# Patient Record
Sex: MALE | Race: ASIAN | NOT HISPANIC OR LATINO | URBAN - METROPOLITAN AREA
[De-identification: names, ages, dates, MRNs, and addresses within clinical notes are randomized per-mention and may not be internally consistent; named-entity substitution may affect disease eponyms.]

---

## 2017-02-02 NOTE — PATIENT DISCUSSION
MILD DRY EYES: PRESCRIBED OTC ARTIFICIAL TEARS BID - QID, OU RETURN FOR FOLLOW-UP AS SCHEDULED OR SOONER IF SYMPTOMS WORSEN.

## 2017-02-02 NOTE — PATIENT DISCUSSION
New Prescription: erythromycin (erythromycin): ointment: 5 mg/gram (0.5 %) a small amount three times a day as directed into affected eye 02-

## 2017-04-24 ENCOUNTER — IMPORTED ENCOUNTER (OUTPATIENT)
Dept: URBAN - METROPOLITAN AREA CLINIC 9 | Facility: CLINIC | Age: 77
End: 2017-04-24

## 2017-08-15 NOTE — PATIENT DISCUSSION
Continue: erythromycin (erythromycin): ointment: 5 mg/gram (0.5 %) a small amount three times a day as directed into affected eye 02-

## 2017-12-20 ENCOUNTER — IMPORTED ENCOUNTER (OUTPATIENT)
Dept: URBAN - METROPOLITAN AREA CLINIC 9 | Facility: CLINIC | Age: 77
End: 2017-12-20

## 2018-06-25 ENCOUNTER — IMPORTED ENCOUNTER (OUTPATIENT)
Dept: URBAN - METROPOLITAN AREA CLINIC 9 | Facility: CLINIC | Age: 78
End: 2018-06-25

## 2018-12-10 ENCOUNTER — IMPORTED ENCOUNTER (OUTPATIENT)
Dept: URBAN - METROPOLITAN AREA CLINIC 9 | Facility: CLINIC | Age: 78
End: 2018-12-10

## 2019-06-11 ENCOUNTER — IMPORTED ENCOUNTER (OUTPATIENT)
Dept: URBAN - METROPOLITAN AREA CLINIC 9 | Facility: CLINIC | Age: 79
End: 2019-06-11

## 2019-12-17 ENCOUNTER — IMPORTED ENCOUNTER (OUTPATIENT)
Dept: URBAN - METROPOLITAN AREA CLINIC 9 | Facility: CLINIC | Age: 79
End: 2019-12-17

## 2020-02-11 NOTE — PATIENT DISCUSSION
Continue: Artificial Tears (polyvin alc) (polyvinyl alcohol): drops: 1.4% drop every three days into both eyes

## 2020-06-16 ENCOUNTER — IMPORTED ENCOUNTER (OUTPATIENT)
Dept: URBAN - METROPOLITAN AREA CLINIC 9 | Facility: CLINIC | Age: 80
End: 2020-06-16

## 2020-12-16 ENCOUNTER — IMPORTED ENCOUNTER (OUTPATIENT)
Dept: URBAN - METROPOLITAN AREA CLINIC 9 | Facility: CLINIC | Age: 80
End: 2020-12-16

## 2020-12-16 PROBLEM — H04.123: Noted: 2020-12-16

## 2020-12-16 PROBLEM — H02.054: Noted: 2020-12-16

## 2020-12-16 PROBLEM — H35.3131: Noted: 2020-12-16

## 2020-12-16 PROBLEM — H04.223: Noted: 2020-12-16

## 2020-12-16 PROBLEM — H11.152: Noted: 2020-12-16

## 2020-12-16 PROBLEM — H01.025: Noted: 2020-12-16

## 2020-12-16 PROBLEM — H01.022: Noted: 2020-12-16

## 2020-12-16 PROBLEM — E11.9: Noted: 2020-12-16

## 2020-12-16 PROBLEM — H26.491: Noted: 2020-12-16

## 2020-12-16 PROBLEM — INACTIVE: Noted: 2020-12-16

## 2021-10-06 NOTE — PATIENT DISCUSSION
Continue: Artificial Tears (polyvin alc) (polyvinyl alcohol): drops: 1.4% drop every three days into both eyes.

## 2021-10-18 ASSESSMENT — VISUAL ACUITY
OS_SC: 20/30 - SN
OS_SC: 20/40 -2 SN
OD_SC: 20/30 -2 SN
OD_PH: 20/40 + SN
OD_SC: 20/30 SN
OD_SC: 20/30 + SN
OD_SC: 20/40 +2 SN
OS_SC: 20/30 -2 SN
OS_SC: 20/40 SN
OD_SC: 20/30 SN
OS_PH: 20/40 SN
OS_SC: 20/40 -2 SN
OD_SC: 20/40 - SN
OS_SC: 20/40 + SN
OD_SC: 20/30 SN
OS_PH: 20/30 -2 SN
OD_SC: 20/30 +2 SN
OS_SC: 20/30 -2 SN
OS_SC: 20/50 + SN
OD_PH: 20/25 - SN

## 2021-10-18 ASSESSMENT — TONOMETRY
OS_IOP_MMHG: 15
OS_IOP_MMHG: 13
OD_IOP_MMHG: 16
OD_IOP_MMHG: 15
OD_IOP_MMHG: 17
OS_IOP_MMHG: 13
OS_IOP_MMHG: 16
OS_IOP_MMHG: 11
OD_IOP_MMHG: 15
OS_IOP_MMHG: 15
OD_IOP_MMHG: 14
OD_IOP_MMHG: 17

## 2021-12-08 ENCOUNTER — ESTABLISHED PATIENT (OUTPATIENT)
Dept: URBAN - METROPOLITAN AREA CLINIC 17 | Facility: CLINIC | Age: 81
End: 2021-12-08

## 2021-12-08 DIAGNOSIS — H26.491: ICD-10-CM

## 2021-12-08 DIAGNOSIS — H35.3131: ICD-10-CM

## 2021-12-08 DIAGNOSIS — H04.123: ICD-10-CM

## 2021-12-08 DIAGNOSIS — E11.9: ICD-10-CM

## 2021-12-08 PROCEDURE — 92134 CPTRZ OPH DX IMG PST SGM RTA: CPT

## 2021-12-08 PROCEDURE — 99214 OFFICE O/P EST MOD 30 MIN: CPT

## 2021-12-08 ASSESSMENT — VISUAL ACUITY
OU_SC: J3+1
OS_SC: 20/40
OU_SC: 20/40
OD_SC: 20/40

## 2021-12-08 ASSESSMENT — TONOMETRY
OD_IOP_MMHG: 10
OS_IOP_MMHG: 10

## 2022-06-19 RX ORDER — GLIMEPIRIDE 4 MG/1
TABLET ORAL
COMMUNITY

## 2022-06-19 RX ORDER — LOSARTAN POTASSIUM 50 MG/1
TABLET ORAL
COMMUNITY

## 2022-06-19 RX ORDER — LEVOTHYROXINE SODIUM 88 UG/1
TABLET ORAL
COMMUNITY

## 2022-06-19 RX ORDER — ACETAMINOPHEN 500 MG
TABLET ORAL
COMMUNITY

## 2022-06-19 RX ORDER — DOXEPIN HYDROCHLORIDE 3 MG/1
TABLET ORAL
COMMUNITY
Start: 2021-12-21

## 2022-06-19 RX ORDER — TAMSULOSIN HYDROCHLORIDE 0.4 MG/1
CAPSULE ORAL
COMMUNITY

## 2022-06-19 RX ORDER — ROSUVASTATIN CALCIUM 20 MG/1
TABLET, COATED ORAL
COMMUNITY

## 2022-06-19 RX ORDER — SOLIFENACIN SUCCINATE 10 MG/1
TABLET, FILM COATED ORAL
COMMUNITY

## 2022-06-19 RX ORDER — MECLIZINE HYDROCHLORIDE 25 MG/1
TABLET ORAL
COMMUNITY
Start: 2021-07-23

## 2022-06-19 RX ORDER — METOPROLOL SUCCINATE 25 MG/1
TABLET, EXTENDED RELEASE ORAL
COMMUNITY

## 2022-12-12 ENCOUNTER — ESTABLISHED PATIENT (OUTPATIENT)
Dept: URBAN - METROPOLITAN AREA CLINIC 17 | Facility: CLINIC | Age: 82
End: 2022-12-12

## 2022-12-12 PROCEDURE — 92134 CPTRZ OPH DX IMG PST SGM RTA: CPT

## 2022-12-12 PROCEDURE — 92014 COMPRE OPH EXAM EST PT 1/>: CPT

## 2022-12-12 ASSESSMENT — VISUAL ACUITY
OD_SC: 20/60+1
OU_SC: 20/40
OS_SC: 20/60+1

## 2022-12-12 ASSESSMENT — TONOMETRY
OD_IOP_MMHG: 12
OS_IOP_MMHG: 13

## 2023-10-05 ENCOUNTER — ESTABLISHED PATIENT (OUTPATIENT)
Dept: URBAN - METROPOLITAN AREA CLINIC 18 | Facility: CLINIC | Age: 83
End: 2023-10-05

## 2023-10-05 DIAGNOSIS — H43.813: ICD-10-CM

## 2023-10-05 DIAGNOSIS — E11.9: ICD-10-CM

## 2023-10-05 DIAGNOSIS — H04.223: ICD-10-CM

## 2023-10-05 DIAGNOSIS — H35.3131: ICD-10-CM

## 2023-10-05 DIAGNOSIS — Z96.1: ICD-10-CM

## 2023-10-05 DIAGNOSIS — H04.123: ICD-10-CM

## 2023-10-05 DIAGNOSIS — H11.042: ICD-10-CM

## 2023-10-05 PROCEDURE — 92014 COMPRE OPH EXAM EST PT 1/>: CPT

## 2023-10-05 PROCEDURE — 92134 CPTRZ OPH DX IMG PST SGM RTA: CPT

## 2023-10-05 PROCEDURE — 92015 DETERMINE REFRACTIVE STATE: CPT

## 2023-10-05 ASSESSMENT — VISUAL ACUITY
OD_SC: 20/40+2
OS_SC: 20/30-2

## 2023-10-05 ASSESSMENT — TONOMETRY
OD_IOP_MMHG: 10
OS_IOP_MMHG: 10

## 2025-04-20 NOTE — PATIENT DISCUSSION
Continue: erythromycin (erythromycin): ointment: 5 mg/gram (0.5 %) a small amount three times a day as directed into affected eye 02- No